# Patient Record
(demographics unavailable — no encounter records)

---

## 2025-05-28 NOTE — PHYSICAL EXAM
[MA] : MA [Soft] : soft [Non-tender] : non-tender [Non-distended] : non-distended [Examination Of The Breasts] : a normal appearance [Breast Palpation Diffuse Fibrous Tissue Bilateral] : fibrocystic changes [No Masses] : no breast masses were palpable [Labia Majora] : normal [Labia Minora] : normal [Normal] : normal [FreeTextEntry2] : maura

## 2025-07-09 NOTE — PROCEDURE
[IUD Placement] : intrauterine device (IUD) placement [Consent Obtained] : Consent obtained [Prevention of Pregnancy] : prevention of pregnancy [Abnormal Uterine Bleeding] : abnormal uterine bleeding [Risks] : risks [Benefits] : benefits [Alternatives] : alternatives [Patient] : patient [Infection] : infection [Bleeding] : bleeding [Pain] : pain [Expulsion] : expulsion [Failure] : failure [Uterine Perforation] : uterine perforation [Neg Pregnancy Test] : negative pregnancy test [Betadine] : Betadine [Tenaculum] : Tenaculum [Easy Passage] : Easy passage [Mirena IUD] : Mirena IUD [Tolerated Well] : Patient tolerated the procedure well [No Complications] : No complications

## 2025-07-09 NOTE — HISTORY OF PRESENT ILLNESS
EMERGENCY DEPARTMENT ENCOUNTER  Room Number:  35/35  PCP: Keshav Eason MD  Independent Historians: Family and EMS      HPI:  Chief Complaint: had concerns including Pain.     A complete HPI/ROS/PMH/PSH/SH/FH are unobtainable due to: None    Chronic or social conditions impacting patient care (Social Determinants of Health): None      Context: Anthony Gallegos is a 80 y.o. male with a medical history of enclosing spondylitis, MARTITA, hypertension, and BPH presents emergency department from the nursing facility with reported body wide pain and decreased level of alertness.  The nursing home also states that the patient may have had some abnormal speech but the nurse who is caring for him tonight had never taken care of him in the past and was uncertain of his baseline.  His son at the bedside said he is speech is normal for him this is his general normal state of health as he saw him yesterday during Father's Day and he was the same as he is today.  The son said his sister mentioned this is what happened the last time they took his catheter out.  The patient does not currently have a Mariscal catheter in place.  History is limited as patient is unable to give history for himself and the entirety of history was given by nursing facility and son at the bedside.      Review of prior external notes (non-ED) -and- Review of prior external test results outside of this encounter:   Patient was admitted to the hospital from 3/25/2024 to 4/9/2024 for failure to thrive, UTI, and MARTITA.  Patient was having urinary retention while in the hospital and required Mariscal catheter placement he failed voiding trial and was discharged with a Mariscal catheter in place.  Due to failure to thrive and dysphagia patient is on a feeding tube diet.  He received a PEG tube placement at this hospitalization.  He completed antibiotics inpatient    I reviewed labs from 4/4/2024, creatinine 0.69, BUN 28      PAST MEDICAL HISTORY  Active Ambulatory Problems  [TextBox_4] : pt presents for Mirena IUD insertion      Diagnosis Date Noted    Ankylosing spondylitis of cervical region 06/29/2017    Recent unexplained weight loss 07/14/2017    Abnormal serum lipase level 08/10/2017    Abnormal serum level of amylase 08/10/2017    Hypertension 10/19/2017    Boil 03/22/2018    Immobility 12/20/2023    Fall from bed 12/20/2023    Tetrahydrocannabinol (THC) use disorder, mild, abuse 12/20/2023    Generalized pain 12/20/2023     12/20/2023    Grief 12/20/2023    DISH (disseminated idiopathic skeletal hyperostosis) 12/20/2023    Generalized weakness 12/21/2023    Severe malnutrition 12/24/2023    Altered mental status 01/21/2024    Transient alteration of awareness 01/22/2024    GERD without esophagitis 01/22/2024    BPH (benign prostatic hyperplasia) 01/22/2024    UTI (urinary tract infection) 01/22/2024    Hyperglycemia 01/22/2024    Decreased oral intake 03/25/2024    Dysphagia 03/25/2024    Failure to thrive in adult 04/09/2024    Immunosuppression due to drug therapy 04/09/2024     Resolved Ambulatory Problems     Diagnosis Date Noted    Urine retention 12/20/2023    Constipation 12/20/2023    Leukocytosis 01/22/2024    Dehydration 01/22/2024    MARTITA (acute kidney injury) 01/22/2024    Altered mental state 01/22/2024    Encephalopathy 03/26/2024     Past Medical History:   Diagnosis Date    Abscess of scrotum     Arthritis     AS (ankylosing spondylitis)     History of MRSA infection     Uveitis          PAST SURGICAL HISTORY  Past Surgical History:   Procedure Laterality Date    COLONOSCOPY      ENDOSCOPY W/ PEG TUBE PLACEMENT N/A 3/29/2024    Procedure: ESOPHAGOGASTRODUODENOSCOPY WITH PERCUTANEOUS ENDOSCOPIC GASTROSTOMY TUBE INSERTION;  Surgeon: Khadar Broderick MD;  Location: [facility] ENDOSCOPY;  Service: General;  Laterality: N/A;  PRE/POST - DYSPHAGIA    ROTATOR CUFF REPAIR Right     TOTAL HIP ARTHROPLASTY Left 2014         FAMILY HISTORY  Family History   Problem Relation Age of Onset    Alzheimer's disease  Mother          SOCIAL HISTORY  Social History     Socioeconomic History    Marital status:     Number of children: 2   Tobacco Use    Smoking status: Never    Smokeless tobacco: Never   Vaping Use    Vaping status: Never Used   Substance and Sexual Activity    Alcohol use: No    Drug use: No    Sexual activity: Defer         ALLERGIES  Patient has no known allergies.      REVIEW OF SYSTEMS  Included in HPI  All systems reviewed and negative except for those discussed in HPI.      PHYSICAL EXAM    I have reviewed the triage vital signs and nursing notes.    ED Triage Vitals [06/17/24 2321]   Temp Heart Rate Resp BP SpO2   98.6 °F (37 °C) 110 22 108/68 96 %      Temp src Heart Rate Source Patient Position BP Location FiO2 (%)   Oral -- -- -- --       Physical Exam  Constitutional:       Comments: Fairly obtunded, cachectic and chronically ill-appearing.  Will minimally open his eyes when stimulated and will withdraw to pain   HENT:      Head: Normocephalic and atraumatic.      Mouth/Throat:      Mouth: Mucous membranes are moist.   Eyes:      Extraocular Movements: Extraocular movements intact.      Pupils: Pupils are equal, round, and reactive to light.   Cardiovascular:      Rate and Rhythm: Normal rate and regular rhythm.      Pulses: Normal pulses.      Heart sounds: Normal heart sounds.   Pulmonary:      Effort: Pulmonary effort is normal. No respiratory distress.      Breath sounds: Normal breath sounds. No wheezing, rhonchi or rales.   Abdominal:      General: Abdomen is flat. There is no distension.      Palpations: Abdomen is soft.      Tenderness: There is abdominal tenderness (diffuse abdominal tenderness). There is no guarding or rebound.   Musculoskeletal:         General: Normal range of motion.      Cervical back: Normal range of motion and neck supple.   Skin:     General: Skin is warm and dry.      Capillary Refill: Capillary refill takes less than 2 seconds.   Neurological:      Mental  Status: He is oriented to person, place, and time. Mental status is at baseline.   Psychiatric:         Mood and Affect: Mood normal.         Behavior: Behavior normal.         LAB RESULTS  Recent Results (from the past 24 hour(s))   Comprehensive Metabolic Panel    Collection Time: 06/18/24 12:33 AM    Specimen: Blood   Result Value Ref Range    Glucose 111 (H) 65 - 99 mg/dL     (H) 8 - 23 mg/dL    Creatinine 3.75 (H) 0.76 - 1.27 mg/dL    Sodium 130 (L) 136 - 145 mmol/L    Potassium 5.7 (H) 3.5 - 5.2 mmol/L    Chloride 96 (L) 98 - 107 mmol/L    CO2 20.5 (L) 22.0 - 29.0 mmol/L    Calcium 9.1 8.6 - 10.5 mg/dL    Total Protein 8.3 6.0 - 8.5 g/dL    Albumin 2.9 (L) 3.5 - 5.2 g/dL    ALT (SGPT) 25 1 - 41 U/L    AST (SGOT) 34 1 - 40 U/L    Alkaline Phosphatase 78 39 - 117 U/L    Total Bilirubin 0.6 0.0 - 1.2 mg/dL    Globulin 5.4 gm/dL    A/G Ratio 0.5 g/dL    BUN/Creatinine Ratio 27.5 (H) 7.0 - 25.0    Anion Gap 13.5 5.0 - 15.0 mmol/L    eGFR 15.6 (L) >60.0 mL/min/1.73   Lactic Acid, Plasma    Collection Time: 06/18/24 12:33 AM    Specimen: Blood   Result Value Ref Range    Lactate 2.0 0.5 - 2.0 mmol/L   Procalcitonin    Collection Time: 06/18/24 12:33 AM    Specimen: Blood   Result Value Ref Range    Procalcitonin 21.30 (H) 0.00 - 0.25 ng/mL   Magnesium    Collection Time: 06/18/24 12:33 AM    Specimen: Blood   Result Value Ref Range    Magnesium 2.5 (H) 1.6 - 2.4 mg/dL   CBC Auto Differential    Collection Time: 06/18/24 12:33 AM    Specimen: Blood   Result Value Ref Range    WBC 5.95 3.40 - 10.80 10*3/mm3    RBC 3.86 (L) 4.14 - 5.80 10*6/mm3    Hemoglobin 10.5 (L) 13.0 - 17.7 g/dL    Hematocrit 31.7 (L) 37.5 - 51.0 %    MCV 82.1 79.0 - 97.0 fL    MCH 27.2 26.6 - 33.0 pg    MCHC 33.1 31.5 - 35.7 g/dL    RDW 15.7 (H) 12.3 - 15.4 %    RDW-SD 46.7 37.0 - 54.0 fl    MPV 8.3 6.0 - 12.0 fL    Platelets 281 140 - 450 10*3/mm3    nRBC 0.0 0.0 - 0.2 /100 WBC   Manual Differential    Collection Time: 06/18/24 12:33 AM     Specimen: Blood   Result Value Ref Range    Neutrophil % 73.2 42.7 - 76.0 %    Lymphocyte % 14.4 (L) 19.6 - 45.3 %    Monocyte % 10.3 5.0 - 12.0 %    Eosinophil % 1.0 0.3 - 6.2 %    Basophil % 0.0 0.0 - 1.5 %    Atypical Lymphocyte % 1.0 0.0 - 5.0 %    Neutrophils Absolute 4.36 1.70 - 7.00 10*3/mm3    Lymphocytes Absolute 0.92 0.70 - 3.10 10*3/mm3    Monocytes Absolute 0.61 0.10 - 0.90 10*3/mm3    Eosinophils Absolute 0.06 0.00 - 0.40 10*3/mm3    Basophils Absolute 0.00 0.00 - 0.20 10*3/mm3    Anisocytosis Slight/1+ None Seen    Macrocytes Slight/1+ None Seen    Poikilocytes Slight/1+ None Seen    Polychromasia Slight/1+ None Seen    WBC Morphology Normal Normal    Platelet Morphology Normal Normal   Urinalysis With Microscopic If Indicated (No Culture) - Straight Cath    Collection Time: 06/18/24 12:34 AM    Specimen: Straight Cath; Urine   Result Value Ref Range    Color, UA Dark Yellow (A) Yellow, Straw    Appearance, UA Turbid (A) Clear    pH, UA 5.5 5.0 - 8.0    Specific Gravity, UA 1.015 1.005 - 1.030    Glucose, UA Negative Negative    Ketones, UA Trace (A) Negative    Bilirubin, UA Negative Negative    Blood, UA Large (3+) (A) Negative    Protein,  mg/dL (2+) (A) Negative    Leuk Esterase, UA Large (3+) (A) Negative    Nitrite, UA Negative Negative    Urobilinogen, UA 0.2 E.U./dL 0.2 - 1.0 E.U./dL   Urinalysis, Microscopic Only - Straight Cath    Collection Time: 06/18/24 12:34 AM    Specimen: Straight Cath; Urine   Result Value Ref Range    RBC, UA 6-10 (A) None Seen, 0-2 /HPF    WBC, UA Too Numerous to Count (A) None Seen, 0-2 /HPF    Bacteria, UA 4+ (A) None Seen /HPF    Squamous Epithelial Cells, UA 3-6 (A) None Seen, 0-2 /HPF    Hyaline Casts, UA 21-30 None Seen /LPF    Methodology Automated Microscopy          RADIOLOGY  CT Abdomen Pelvis Without Contrast    Result Date: 6/18/2024  Patient: JADE MARAVILLA  Time Out: 04:06 Exam(s): CT ABDOMEN + PELVIS Without Contrast EXAM:   CT Abdomen and  Pelvis Without Intravenous Contrast CLINICAL HISTORY:    Reason for exam: abdominal pain, renal failure. TECHNIQUE:   Axial computed tomography images of the abdomen and pelvis without intravenous contrast.  CTDI is 15.69 mGy and DLP is 827.7 mGy-cm.  This CT exam was performed according to the principle of ALARA (As Low As Reasonably Achievable) by using one or more of the following dose reduction techniques: automated exposure control, adjustment of the mA and or kV according to patient size, and or use of iterative reconstruction technique. COMPARISON:   March 25, 2024 scattered fibrotic changes in the lung bases. FINDINGS:   Lung bases:  Unremarkable.  No mass.  No consolidation.  ABDOMEN:   Liver:  Unremarkable.   Gallbladder and bile ducts:  Unremarkable.  No calcified stones.  No ductal dilation.   Pancreas:  Unremarkable.  No ductal dilation.   Spleen:  Unremarkable.  No splenomegaly.   Adrenals:  Unremarkable.  No mass.   Kidneys and ureters:  Mild to moderate bilateral hydronephrosis and hydroureter.  No ureterolithiasis is seen.  Likely urinary retention.  5. 3 cm simple cyst in the lower pole the right kidney.  No follow-up is required.   Stomach and bowel:  Gaseous distention of the transverse colon.  Possible mild colonic ileus.  The sigmoid colon is partially compressed by the enlarged urinary bladder.  No mucosal thickening.  PELVIS:   Appendix:  No findings to suggest acute appendicitis.   Bladder:  There is dilation of the urinary bladder measuring 20 cm craniocaudad.  There is irregular wall thickening with a 7 cm bladder diverticulum near the vertex, similar to previous.   Reproductive:  Unremarkable as visualized.  ABDOMEN and PELVIS:   Intraperitoneal space:  Unremarkable.  No free air.  No significant fluid collection.   Bones joints:  There is metallic artifact from a left hip arthroplasty.  No acute fracture or dislocation is seen.  Ankylosis of the spine.  No acute fracture is seen.   Soft  tissues:  Unremarkable.   Vasculature:  Unremarkable.  No abdominal aortic aneurysm.   Lymph nodes:  Unremarkable.  No enlarged lymph nodes.   Tubes, lines and devices:  There is a PEG tube in the stomach.  The stomach is decompressed. IMPRESSION:     1.  There is dilation of the urinary bladder measuring 20 cm craniocaudad.   There is irregular wall thickening with a 7 cm bladder diverticulum near the vertex, similar to previous.  Consider chronic bladder outlet obstruction and urinary retention. 2.  Mild to moderate bilateral hydronephrosis and hydroureter.  No ureterolithiasis is seen.  Likely urinary retention. 3.  Gaseous distention of the transverse colon.  Possible mild colonic ileus.  The sigmoid colon is partially compressed by the enlarged urinary bladder.     Electronically signed by Haider Kuhn MD on 06-18-24 at 0406    CT Head Without Contrast    Result Date: 6/18/2024  Patient: JADE MARAVILLA  Time Out: 03:01 Exam(s): CT HEAD Without Contrast EXAM:   CT Head Without Intravenous Contrast CLINICAL HISTORY:    Reason for exam: AMS. TECHNIQUE:   Axial computed tomography images of the head brain without intravenous contrast.  CTDI is 55.7 mGy and DLP is 1018.9 mGy-cm.  This CT exam was performed according to the principle of ALARA (As Low As Reasonably Achievable) by using one or more of the following dose reduction techniques: automated exposure control, adjustment of the mA and or kV according to patient size, and or use of iterative reconstruction technique. COMPARISON: 3.29.24 FINDINGS:   Brain:  No hemorrhage, herniation, or mass effect.  Chronic microvascular ischemic changes.   Ventricles:  No hydrocephalus.  Age related cerebral volume loss.   Bones joints:  Unremarkable.   Soft tissues:  Unremarkable.   Sinuses:  No air fluid levels.   Mastoid air cells:  Clear. IMPRESSION:     No acute hemorrhage, hydrocephalus, or mass effect.     Electronically signed by Manasa Reina MD on 06-18-24 at  0301    XR Chest 1 View    Result Date: 6/18/2024  Patient: JADE MARAVILLA  Time Out: 02:31 Exam(s): XR CXR 1 VIEW EXAM:   XR Chest, 1 View CLINICAL HISTORY:    Reason for exam: AMS. TECHNIQUE:   Frontal view of the chest. COMPARISON: 3 25 2024 FINDINGS: See Impression. IMPRESSION: Prominent interstitial markings are seen.  Correlate with edema or infection.  No effusion.  Unchanged heart size.    Electronically signed by Manasa Reina MD on 06-18-24 at 0231       MEDICATIONS GIVEN IN ER  Medications   sodium chloride 0.9 % flush 10 mL (has no administration in time range)   sodium chloride 0.9 % flush 10 mL (has no administration in time range)   sodium chloride 0.9 % infusion 40 mL (has no administration in time range)   sodium chloride 0.9 % infusion (has no administration in time range)   acetaminophen (TYLENOL) tablet 650 mg (has no administration in time range)     Or   acetaminophen (TYLENOL) 160 MG/5ML oral solution 650 mg (has no administration in time range)     Or   acetaminophen (TYLENOL) suppository 650 mg (has no administration in time range)   sennosides-docusate (PERICOLACE) 8.6-50 MG per tablet 2 tablet (has no administration in time range)     And   polyethylene glycol (MIRALAX) packet 17 g (has no administration in time range)     And   bisacodyl (DULCOLAX) EC tablet 5 mg (has no administration in time range)     And   bisacodyl (DULCOLAX) suppository 10 mg (has no administration in time range)   ondansetron ODT (ZOFRAN-ODT) disintegrating tablet 4 mg (has no administration in time range)     Or   ondansetron (ZOFRAN) injection 4 mg (has no administration in time range)   calcium carbonate (TUMS) chewable tablet 500 mg (200 mg elemental) (has no administration in time range)   ertapenem (INVanz) 1,000 mg in sodium chloride 0.9 % 100 mL MBP (has no administration in time range)   HYDROcodone-acetaminophen (NORCO) 5-325 MG per tablet 1 tablet (has no administration in time range)   sodium  chloride 0.9 % bolus 500 mL (0 mL Intravenous Stopped 6/18/24 0322)   cefTRIAXone (ROCEPHIN) 1,000 mg in sodium chloride 0.9 % 100 mL MBP (0 mg Intravenous Stopped 6/18/24 0247)   ertapenem (INVanz) 1,000 mg in sodium chloride 0.9 % 100 mL MBP (0 mg Intravenous Stopped 6/18/24 0322)           OUTPATIENT MEDICATION MANAGEMENT:  Current Facility-Administered Medications Ordered in Epic   Medication Dose Route Frequency Provider Last Rate Last Admin    acetaminophen (TYLENOL) tablet 650 mg  650 mg Oral Q4H PRN Rosa Cormier APRN        Or    acetaminophen (TYLENOL) 160 MG/5ML oral solution 650 mg  650 mg Oral Q4H PRN Rosa Cormier APRN        Or    acetaminophen (TYLENOL) suppository 650 mg  650 mg Rectal Q4H PRN Rosa Cormier APRN        sennosides-docusate (PERICOLACE) 8.6-50 MG per tablet 2 tablet  2 tablet Oral BID PRN Rosa Cormier APRN        And    polyethylene glycol (MIRALAX) packet 17 g  17 g Oral Daily PRN Rosa Cormier APRN        And    bisacodyl (DULCOLAX) EC tablet 5 mg  5 mg Oral Daily PRN Rosa Cormier APRN        And    bisacodyl (DULCOLAX) suppository 10 mg  10 mg Rectal Daily PRN Rosa Cormier APRN        calcium carbonate (TUMS) chewable tablet 500 mg (200 mg elemental)  2 tablet Oral BID PRN Rosa Cormier APRN        [START ON 6/19/2024] ertapenem (INVanz) 1,000 mg in sodium chloride 0.9 % 100 mL MBP  1,000 mg Intravenous Q24H Rosa Cormier APRN        HYDROcodone-acetaminophen (NORCO) 5-325 MG per tablet 1 tablet  1 tablet Oral Q6H PRN Rosa Comrier APRN        ondansetron ODT (ZOFRAN-ODT) disintegrating tablet 4 mg  4 mg Oral Q6H PRN Rosa Cormier APRN        Or    ondansetron (ZOFRAN) injection 4 mg  4 mg Intravenous Q6H PRN Rosa Cormier APRN        sodium chloride 0.9 % flush 10 mL  10 mL Intravenous Q12H Rosa Cormier APRN        sodium chloride 0.9 % flush 10 mL  10 mL Intravenous PRN  Rosa Cormier APRN        sodium chloride 0.9 % infusion 40 mL  40 mL Intravenous PRN Rosa Cormier APRN        sodium chloride 0.9 % infusion  100 mL/hr Intravenous Continuous Rosa Cormier APRN         Current Outpatient Medications Ordered in Epic   Medication Sig Dispense Refill    lansoprazole (PREVACID SOLUTAB) 30 MG Tablet Delayed Release Dispersible disintegrating tablet Administer 1 tablet per G tube Every Morning.      melatonin 3 MG tablet Take 1 tablet by mouth At Night As Needed for Sleep.      methotrexate 2.5 MG tablet Take 1 tablet by mouth 1 (One) Time Per Week. Take 2.5 mg on Wednesday and 2.5 mg on Thursday.  Do not take any medication on Friday through Tuesday.      nitrofurantoin, macrocrystal-monohydrate, (MACROBID) 100 MG capsule Take 1 capsule by mouth 2 (Two) Times a Day.      Omega-3 Fatty Acids (OMEGA 3 PO) Take 1 capsule by mouth Daily.      polyethylene glycol 17 g packet 1 packet daily per tube      potassium chloride (KAYCIEL) 20 mEq/15 mL solution Take 15 mL by mouth Daily.      sennosides-docusate (PERICOLACE) 8.6-50 MG per tablet Administer 2 tablets per G tube Daily.      terazosin (HYTRIN) 1 MG capsule Administer 1 capsule per G tube Every Night.      acetaminophen (TYLENOL) 325 MG tablet Take 2 tablets by mouth Every 4 (Four) Hours As Needed for Mild Pain.      bisacodyl (DULCOLAX) 5 MG EC tablet Take 1 tablet by mouth Daily As Needed for Constipation (Use if polyethylene glycol is ineffective).      Menthol-Zinc Oxide 0.44-20.6 % ointment Apply 1 Application topically to the appropriate area as directed Every 12 (Twelve) Hours.               PROGRESS, DATA ANALYSIS, CONSULTS, AND MEDICAL DECISION MAKING  ORDERS PLACED DURING THIS VISIT:  Orders Placed This Encounter   Procedures    Blood Culture - Blood,    Blood Culture - Blood,    CANDIDA AURIS SCREEN - Swab, Axilla Right, Axilla Left and Groin    XR Chest 1 View    CT Head Without Contrast    CT  Abdomen Pelvis Without Contrast    Comprehensive Metabolic Panel    Urinalysis With Microscopic If Indicated (No Culture) - Urine, Catheter    Lactic Acid, Plasma    Procalcitonin    Magnesium    CBC Auto Differential    Manual Differential    Urinalysis, Microscopic Only - Urine, Clean Catch    Urinalysis With Culture If Indicated -    Basic Metabolic Panel    CBC (No Diff)    Diet: Regular/House; Fluid Consistency: Thin (IDDSI 0)    Vital Signs    Intake & Output    Weigh Patient    Oral Care    Saline Lock & Maintain IV Access    Place Sequential Compression Device    Maintain Sequential Compression Device    Code Status and Medical Interventions:    [facility] (on-call MD unless specified) Details    Inpatient Nephrology Consult    Insert Peripheral IV    Inpatient Admission    CBC & Differential       All labs have been independently interpreted by me.  All radiology studies have been reviewed by me. All EKG's have been independently viewed and interpreted by me.  Discussion below represents my analysis of pertinent findings related to patient's condition, differential diagnosis, treatment plan and final disposition.    Differential diagnosis includes but is not limited to:   My differential diagnosis for generalized weakness includes but is not limited to:  Neuromuscular weakness   CVA  Hemorrhagic stroke  Multiple sclerosis  Amyotrophic Lateral Sclerosis (ALS) (UMN & LMN)  Spinal and bulbar muscular atrophy (Zaid's syndrome)  Spinal cord disease: Infection (Epidural abscess)  Infarction/ischemia  Trauma (Spinal Cord Syndromes)  Inflammation (Transverse Myelitis)  Degenerative (Spinal muscular atrophy)  Tumor  Peripheral nerve disease: Guillain-Vance syndrome  Toxins (Ciguatera)  Tick paralysis  Diabetic peripheral neuropathy  NMJ disease: Myasthenia gravis crisis  Botulism  Organophosphate toxicity  Lambert-Eaton myasthenic syndrome  Rhabdomyolysis  Dermatomyositis  Polymyositis  Alcoholic  myopathy  Non-neuromuscular weakness   ACS  Arrhythmia/Syncope  Severe infection/Sepsis  Hypoglycemia  Periodic paralysis (electrolyte disturbance, K, Mg, Ca)   Hypokalemic periodic paralysis  Thyrotoxic periodic paralysis  Respiratory failure  Symptomatic Anemia  Severe dehydration  Hypothyroidism  Polypharmacy  Malignancy          ED Course:  ED Course as of 06/18/24 0412   Mon Jun 17, 2024   3844 I discussed the case with Dr. Crowley and they agree to evaluate the patient at the bedside.    [CC]   Tue Jun 18, 2024   0043 XR Chest 1 View  My independent interpretation of the imaging study is patchy airspace disease bilaterally [TR]   0105 Creatinine(!): 3.75 [TR]   0105 Hemoglobin(!): 10.5 [TR]   0113 Bacteria, UA(!): 4+ [TR]   0113 WBC, UA(!): Too Numerous to Count [TR]   0114 Lactate: 2.0 [TR]   0116 Procalcitonin(!): 21.30 [CC]   0119 I rechecked the patient.  I discussed the patient's labs, radiology findings (including all incidental findings), diagnosis, and plan for admission. The patient understands and agrees with the plan.   [CC]   0230 Spoke with JAMES Lee with [facility].  Reviewed history, exam, results, treatments.  She agrees admit the patient to Dr. Samuels.  CT pending at the time of admission    [CC]   0312 CT Head Without Contrast  My independent interpretation of the CT of the head is no acute intracranial hemorrhage [CC]   0312 CT Abdomen Pelvis Without Contrast  My independent interpretation of the CT of the abdomen pelvis is largely distended bladder.  Will place Mariscal catheter [CC]      ED Course User Index  [CC] Kailey Verduzco PA-C  [TR] Juan Crowley MD           AS OF 04:12 EDT VITALS:    BP - 129/58  HR - 98  TEMP - 98.6 °F (37 °C) (Oral)  O2 SATS - 99%    MDM:  Patient is an ill 80-year-old male presents emergency department today with failure to thrive.  On arrival here in the emergency department vitals are reassuring, he is afebrile.  On my exam the patient is cachectic and  ill-appearing.  He was evaluated with labs which revealed acute renal failure and urinary tract infection.  He had a procalcitonin of 21 I do suspect this is secondary to his active infection.  Patient's most recent UTI was ESBL, will treat with ertapenem.  He was given 500 cc of IV fluids here in the ED and was found to have urinary retention on CT and Mariscal catheter was placed and he had greater than 1200 output.  Patient will require admission to the hospital for further management of his acute illness.  He is stable at time of admission.      COMPLEXITY OF CARE  The patient requires admission.          DIAGNOSIS  Final diagnoses:   Acute renal failure, unspecified acute renal failure type   Chronic anemia   Hyponatremia   Urinary tract infection associated with indwelling urethral catheter, initial encounter         DISPOSITION  ED Disposition       ED Disposition   Decision to Admit    Condition   --    Comment   Level of Care: Telemetry [5]   Diagnosis: Renal failure [526340]   Admitting Physician: SEBASTIEN GUZMAN [[id number]]   Attending Physician: SEBASTIEN GUZMAN [[id number]]   Certification: I Certify That Inpatient Hospital Services Are Medically Necessary For Greater Than 2 Midnights                        Please note that portions of this document were completed with a voice recognition program.    Note Disclaimer: At [facility], we believe that sharing information builds trust and better relationships. You are receiving this note because you recently visited [facility]. It is possible you will see health information before a provider has talked with you about it. This kind of information can be easy to misunderstand. To help you fully understand what it means for your health, we urge you to discuss this note with your provider.     Kailey Verduzco PA-C  06/18/24 0417